# Patient Record
(demographics unavailable — no encounter records)

---

## 2024-11-27 NOTE — HISTORY OF PRESENT ILLNESS
[FreeTextEntry1] : Interval History: 4/2/2024 Pt never received the azathioprine. On prednisone 5mg. No abdominal pain. 12/28/2023 Last seen in 6/2023, lost to follow up. Has not been on MMF. Has been on medrol 4mg-12mg self-medicating for abdominal pain. MRI abdomen pelvis showing slightly increased mesenteric lymphadenopathy. Pt has had HIV exposure recently with someone that is reportedly undetectable. She is following with her GYN for this.  6/20/2023 Patient missed multiple appointments due to recurrent infections.  She had influenza earlier this year.  Then had fungal throat infection likely from overuse of Flonase and concurrent prednisone use.  She is on her final week of antibiotics as well for another infection.  She never started taking mycophenolate.  She denies any lupus flare symptoms.  No abdominal pain  1/31/2023 when she tapered methylpred to 2mg she started having sporadic abdominal pains so increased to 4mg and has been well on 4mg.  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 Otherwise no rash, joint pain, oral nasal ulcers, hair loss  10/2022 Pt is doing well, no abdominal pain. On Medrol 8mg daily x 1 week Had CT A/P on Monday, no results yet. Seeing GI, requested fecal calprotectin which she has not submitted no SLE symptoms rash joint pain

## 2024-11-27 NOTE — ASSESSMENT
[FreeTextEntry1] : 1.  SLE: Seen by me in 2013 for SLE. She switched doctors to TERESITA Roberson and participated in studies before switching to RAMSES Munguia. She was diagnosed in 2007 (initially given dx of JEREMIAH) with pleural effusions in 2012, arthritis, oral ulcers, cytopenias, Raynaud's, lymphadenopathy, + MARTINE, + DNA, hypocomplementemia.  She has additionally had a history of nausea and vomiting which is attributed to SLE. She has been enrolled in the [employer] EMBRACE trial of belimumab with control of her GI features. She enrolled in [place] Believe trial in Feb 2019 and received rituximab/placebo as well as 48 weeks of subcutaneous belimumab with improvement of her pleuritic chest pains, joint pains, GI symptoms and serologies.   Last seen by Dr. Roberson in Nov 2021  +Jacoud's deformities, SLE- clinically stable 2.  Mesenteric lymphadenopathy:  Patient admitted on 8/2022 with abd pain, nausea and vomiting x 3 days. CT A/P showed possible SMV thrombus. Triple phase CT showed effacement of the SMV with surrounding soft tissue infiltration. Subsequently, Hem/Onc was involved and performed a PET/CT scan which showed multiple sites of pathologic FDG adenopathy including bilateral cervical, left supraclavicular, R paratracheal, extensive para-aortic, mesenteric and bilateral iliac adenopathy suspicious for biologic tumor activity or diffuse inflammatory process. Diffusely increased FDG uptake throughout stomach. Highest SUV noted at midline mesenteric adenopathy 24.1, conglomerate left presacral adenopathy 22.4, and left iliac adenopathy 22, left cervical level 1-2 14.7. GI got involved and patient underwent to EGD which was negative for acute finding. Patient underwent to EUS/FNA on superior mesenteric tissue and showed polymorphous lymphoid population w/tingible-body macrophages, lymphohistiocytic aggregates, and lymphoid tangles. Pancreatic parenchyma w/fibrosis, reactive myofibroblastic proliferation and inflammation. Subsequently, patient underwent to left cervical lymph bx and showed follicular and interfollicular hyperplasia with positive PAX5. Hem/Onc was convinced that this is more reactive lymphadenopathy for underlying inflammatory disease. Due to bowel preparation issue, colonoscopy was not done during that admission. Patient was DC w/Eliquis and prednisone 40 mg daily. APLs labs were all negative. Patient was DC and follow up with Dr. Munguia.   3. Anemia - chronic iron deficiency anemia, previously on IV iron  4.  Vocal cord abnormality  Plan Lab tests Reviewed internal and/or external records of other providers Contact me Flu vaccine left deltoid Prevnar 20 IM right deltoid High risk medications used in the treatment of rheumatic diseases include steroids, disease modifying agents, immunosuppressive therapies, antimalarials, biologics, and chemotherapy. Regardless of which drug or class of drug, the potential toxicities of these therapies mandate close monitoring in the form of a history, physical, and laboratory tests. Systemic Lupus Erythematosus, known as lupus, is a chronic autoimmune disease that can affect any organ in the body posing threats to proper organ function and even to life. Therefore, close surveillance of all bodily functions is required, including but not limited to central and peripheral nervous system, ocular and auditory systems, cardiopulmonary function, kidney function, mucocutaneous and musculoskeletal systems as well as constitutional manifestations. Surveillance consists of history, physical, and laboratory tests. Treatment varies, but most of the drugs used are high risk and therefore also require close monitoring in the form of blood and urine tests.

## 2024-11-27 NOTE — REVIEW OF SYSTEMS
[Fever] : no fever [Chills] : no chills [Feeling Poorly] : not feeling poorly [Feeling Tired] : feeling tired [Recent Weight Loss (___ Lbs)] : recent [unfilled] ~Ulb weight loss [Eye Pain] : no eye pain [Red Eyes] : eyes not red [Earache] : no earache [Loss Of Hearing] : no hearing loss [Abdominal Pain] : abdominal pain [Vomiting] : no vomiting [Constipation] : no constipation [Diarrhea] : no diarrhea [Skin Lesions] : no skin lesions [Skin Wound] : no skin wound [Easy Bleeding] : no tendency for easy bleeding [Easy Bruising] : no tendency for easy bruising [Swollen Glands] : no swollen glands [Swollen Glands In The Neck] : no swollen glands in the neck [FreeTextEntry7] : Dull abdominal pain in the epigastrium and hypogastric area mostly at night 10/10 improved with steroid and oxycodone

## 2024-11-27 NOTE — PHYSICAL EXAM
[General Appearance - Alert] : alert [General Appearance - In No Acute Distress] : in no acute distress [Sclera] : the sclera and conjunctiva were normal [Outer Ear] : the ears and nose were normal in appearance [Neck Cervical Mass (___cm)] : no neck mass was observed [Respiration, Rhythm And Depth] : normal respiratory rhythm and effort [Auscultation Breath Sounds / Voice Sounds] : lungs were clear to auscultation bilaterally [Apical Impulse] : the apical impulse was normal [Heart Rate And Rhythm] : heart rate was normal and rhythm regular [Heart Sounds] : normal S1 and S2 [Edema] : there was no peripheral edema [Abdomen Soft] : soft [Abdomen Tenderness] : non-tender [Cervical Lymph Nodes Enlarged Posterior Bilaterally] : posterior cervical [Cervical Lymph Nodes Enlarged Anterior Bilaterally] : anterior cervical [Supraclavicular Lymph Nodes Enlarged Bilaterally] : supraclavicular [No CVA Tenderness] : no ~M costovertebral angle tenderness [No Spinal Tenderness] : no spinal tenderness [Skin Color & Pigmentation] : normal skin color and pigmentation [] : no rash [Sensation] : the sensory exam was normal to light touch and pinprick [Motor Exam] : the motor exam was normal [Oriented To Time, Place, And Person] : oriented to person, place, and time [Musculoskeletal - Swelling] : no joint swelling seen [FreeTextEntry1] : 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

## 2024-11-27 NOTE — ASSESSMENT
[FreeTextEntry1] : 1.  SLE: Seen by me in 2013 for SLE. She switched doctors to TERES[facility] Roberson and participated in studies before switching to RAMSES Munguia. She was diagnosed in 2007 (initially given dx of JEREMIAH) with pleural effusions in 2012, arthritis, oral ulcers, cytopenias, Raynaud's, lymphadenopathy, + MARTINE, + DNA, hypocomplementemia.  She has additionally had a history of nausea and vomiting which is attributed to SLE. She has been enrolled in the [employer] EMBRACE trial of belimumab with control of her GI features. She enrolled in [place] Believe trial in Feb 2019 and received rituximab/placebo as well as 48 weeks of subcutaneous belimumab with improvement of her pleuritic chest pains, joint pains, GI symptoms and serologies.   Last seen by Dr. Roberson in Nov 2021  +Jacoud's deformities, SLE- clinically stable 2.  Mesenteric lymphadenopathy:  Patient admitted on 8/2022 with abd pain, nausea and vomiting x 3 days. CT A/P showed possible SMV thrombus. Triple phase CT showed effacement of the SMV with surrounding soft tissue infiltration. Subsequently, Hem/Onc was involved and performed a PET/CT scan which showed multiple sites of pathologic FDG adenopathy including bilateral cervical, left supraclavicular, R paratracheal, extensive para-aortic, mesenteric and bilateral iliac adenopathy suspicious for biologic tumor activity or diffuse inflammatory process. Diffusely increased FDG uptake throughout stomach. Highest SUV noted at midline mesenteric adenopathy 24.1, conglomerate left presacral adenopathy 22.4, and left iliac adenopathy 22, left cervical level 1-2 14.7. GI got involved and patient underwent to EGD which was negative for acute finding. Patient underwent to EUS/FNA on superior mesenteric tissue and showed polymorphous lymphoid population w/tingible-body macrophages, lymphohistiocytic aggregates, and lymphoid tangles. Pancreatic parenchyma w/fibrosis, reactive myofibroblastic proliferation and inflammation. Subsequently, patient underwent to left cervical lymph bx and showed follicular and interfollicular hyperplasia with positive PAX5. Hem/Onc was convinced that this is more reactive lymphadenopathy for underlying inflammatory disease. Due to bowel preparation issue, colonoscopy was not done during that admission. Patient was DC w/Eliquis and prednisone 40 mg daily. APLs labs were all negative. Patient was DC and follow up with Dr. Munguia.   3. Anemia - chronic iron deficiency anemia, previously on IV iron  4.  Vocal cord abnormality  Plan Lab tests Reviewed internal and/or external records of other providers Contact me Flu vaccine left deltoid Prevnar 20 IM right deltoid High risk medications used in the treatment of rheumatic diseases include steroids, disease modifying agents, immunosuppressive therapies, antimalarials, biologics, and chemotherapy. Regardless of which drug or class of drug, the potential toxicities of these therapies mandate close monitoring in the form of a history, physical, and laboratory tests. Systemic Lupus Erythematosus, known as lupus, is a chronic autoimmune disease that can affect any organ in the body posing threats to proper organ function and even to life. Therefore, close surveillance of all bodily functions is required, including but not limited to central and peripheral nervous system, ocular and auditory systems, cardiopulmonary function, kidney function, mucocutaneous and musculoskeletal systems as well as constitutional manifestations. Surveillance consists of history, physical, and laboratory tests. Treatment varies, but most of the drugs used are high risk and therefore also require close monitoring in the form of blood and urine tests.

## 2025-03-12 NOTE — ASSESSMENT
[FreeTextEntry1] : 32 F with SLE, recent fluA with superimposed bacterial infection (late February 2025) presents for acute telehealth visit for persistent symptoms. Concerning for sinusitis with component of bacterial conjunctivitis  #sinusitis - s/p 7d of amoxicillin (500 mg TID) with improvement but then return of symptoms - will trial doxycycline 100 mg BID x 7 days - counseled on conservative measures for symptomatic relief - no SOB, cough, wheezing: low concern for superimposed PNA  #conjunctivitis - unilateral, purulent, c/f bacterial conjunctivitis - pt reports she often requires antibiotic ophthalmic ointments in this setting - will trial erythromycin eye drops   RTC in 2 weeks if no improvement  Discussed with Dr. Kieran Frank MD PGY3, Internal Medicine

## 2025-03-12 NOTE — REVIEW OF SYSTEMS
[Fever] : fever [Chills] : chills [Discharge] : discharge [Dryness] : dryness  [Nasal Discharge] : nasal discharge [Postnasal Drip] : postnasal drip [Negative] : Gastrointestinal [Chest Pain] : no chest pain [Palpitations] : no palpitations

## 2025-03-12 NOTE — HISTORY OF PRESENT ILLNESS
[Home] : at home, [unfilled] , at the time of the visit. [Medical Office: ([facility])___] : at the medical office located in  [Telehealth (audio & video)] : This visit was provided via telehealth using real-time 2-way audio visual technology. [Verbal consent obtained from patient] : the patient, [unfilled] [FreeTextEntry8] : 32 F with SLE  Finished antibiotics late February (7d course of amoxicillin 500 mg TID). Reports improvement with antibiotics. Since then, reports continued symptoms - intermittent chills, headaches, and sinus pains with drainage (clear). Feels chills coming on and takes ibuprofen early to break the fever. Past 2-3 days also endorses eye infection - primarily R eye, woke up this morning with purulent drainage around the eye.   Reports that she has previously had instances where it has taken her an extended period of time to recover from a URI and has sometimes required multiple courses of abx.  Denies productive cough, wheezing, SOB.

## 2025-03-12 NOTE — PHYSICAL EXAM
[Well Nourished] : well nourished [Well Developed] : well developed [Well-Appearing] : well-appearing [Normal Voice/Communication] : normal voice/communication [No Respiratory Distress] : no respiratory distress  [No Accessory Muscle Use] : no accessory muscle use [de-identified] : conjunctiva appear clear

## 2025-05-29 NOTE — ASSESSMENT
[FreeTextEntry1] : 32 F with SLE, recent fluA with superimposed bacterial infection (late February 2025) and again in March 2025 presents for acute telehealth visit for possible cold.

## 2025-05-29 NOTE — HISTORY OF PRESENT ILLNESS
[Home] : at home, [unfilled] , at the time of the visit. [Medical Office: ([facility])___] : at the medical office located in  [Telehealth (audio & video)] : This visit was provided via telehealth using real-time 2-way audio visual technology. [Verbal consent obtained from patient] : the patient, [unfilled] [FreeTextEntry8] : 32 F with SLE, recent fluA with superimposed bacterial infection (late 2025) and again in 2025 presents for acute telehealth visit for possible cold.  Pt reports that since her amoxicillin and doxycyxline she has been unable to shake her symptoms. Her symptoms completely resolved after abx in march but returned soon after. She has been sick for about the last week in the past. Mom might be sick. + Stuffy nose, runny nose, cough. +B/l eyes itchy and irritated. Sometimes painful. + eye yellow discharge throughout the whole day that came back after wiping. + White mucous that shes coughing up. No fevers. Some chills. +N/V a couple of days ago. +Using nasal spray with relief. No diarrhea. Home COVID test is .   Meds: Valacyclovir as needed Prednisone 5mg but inconsistently taking. Plaquenil (also not taking) Has been off of meds for 5 months according to rheum  Will be transitioning to other [facility] PCP soon. States that her GYN recommended that she be co-prescribed fluconazole whenever she takes abx as she has frequent yeast infections.

## 2025-05-29 NOTE — PHYSICAL EXAM
[de-identified] : Virtual. Non toxic appearing and in no distress. Talking in full sentences. L eye with some erythyema. Hard to discern discharge. She notes some very mild tender lymphadenopathy in the neck.

## 2025-05-29 NOTE — PLAN
[FreeTextEntry1] : #Sinusitis - Given recurrence in symptoms and prior help from abx, will treat with 7 days of augmentin - Counseled to make appt with ENT for structural evaluation - Check flu/covid/RSV swab  #Conjunctivitis - + Yellow discharge that comes back after wiping. Sometimes wears contacts for dance shows. Ciprofloxacin eye drops sent.  #Candidiasis - Prophylactic fluconazole 2 pills sent  Counseled to let us know if any issues or further concerns.

## 2025-06-24 NOTE — HISTORY OF PRESENT ILLNESS
[de-identified] : 30 year old female presents with hx of multiple viral sx, influenza, conjunctivitis and bronchial inflammation. Went on antibiotics. Patient has lupus so took time to resolve. Now with dry cough and sore throat. On prednisone for lupus which helps sore throat. Also has tried flonase for last week without improvement. Denies using inhalers for lungs.  Patient states it hurts with swallowing. Denies nasal congestion or runny nose, denies sinus pressure.  Patient now has a hoarse voice and was told she has nodules on her vocal cords.   Patient is following up for constant throat pain for last 3 days. Believes she has a cold. Has been taking alieve for pain which helps. If not taking alieve, feels pain with swallowing. Had a fever 101 yesterday, stuffy nose yesterday and runny nose today. Denies cough. Denies difficulty swallowing, denies changes in voice.   Patient is following up for worsening constant throat pain for last week, states it was occurring for a month but got more severe. States painful to swallow and feels discomfort in right ear upon swallowing. Throat feels inflamed all day long. Voice goes in and out, has been trying to drink lots of tea. States her usual prednisone levels it out but doesnt resolve discomfort. Pain improves with OTC pain medication like alieve.    Also getting intermittent runny and stuffy nose.  [FreeTextEntry1] : Patient is following up with runny nose, stuffy nose, and pressure at nose and cheeks last month. Took amox-clav from urgent care 2 weeks ago, they then referred her to ENT. Did not take lupus medications today. Used flonase for 1 week in June, states was not consistent.   States has sore throat and throat swelling for last 2 weeks.   also feeling pulling and pressure at ears.

## 2025-06-24 NOTE — PROCEDURE
[FreeTextEntry6] : Procedure performed: Nasal Endoscopy- Diagnostic Pre-op indication(s): nasal congestion Post-op indication(s): nasal congestion  Verbal and/or written consent obtained from patient Anterior rhinoscopy insufficient to account for symptoms Scope #: 3,  flexible fiber optic telescope  The scope was introduced in the nasal passage between the middle and inferior turbinates to exam the inferior portion of the middle meatus and the fontanelle, as well as the maxillary ostia.  Next, the scope was passed medically and posteriorly to the middle turbinates to examine the sphenoethmoid recess and the superior turbinate region. Upon visualization the finders are as follows: Nasal Septum: sigmoidal L septal deviation Bilateral - Mucosa: boggy turbinates, Mucous: thick secretions b/l, Polyp: not seen, Inferior Turbinate: boggy, Middle Turbinate: normal, Superior Turbinate: normal, Inferior Meatus: narrow, Middle Meatus: narrow, Super Meatus:normal, Sphenoethmoidal Recess: clear [de-identified] : Procedure performed: laryngeal Endoscopy- Diagnostic Pre-op/post op indication: dysphonia Verbal and/or written consent obtained from patient, Patient was unable to cooperate with mirror Scope #: 3, flexible fiber optic telescope used  Scope was introduced through the nose passed on the floor of the nose to the nasopharynx and then followed down the soft palate to the lower pharynx. The tongue Base, Larynx, Hypopharynx were examined. Base of tongue was symmetric, vallecular was clear, epiglottis was not deformed, subglottis/ pyriform and posterior pharyngeal walls were clear. No erythema, edema, pooling of secretions, masses or lesions. Airway patent, no foreign body visualized. Postcricoid area with moderate erythema and mild edema. + intra-artenoid bar. No pooling of secretions. True vocal cords, vestibular folds, ventricles, pyriform sinuses, and aryepiglottic folds appear normal bilaterally. Vocal cords mobile with good contact b/l.

## 2025-06-24 NOTE — CONSULT LETTER
[Please see my note below.] : Please see my note below. [FreeTextEntry1] : Dear Dr. SANDY MORALES \par  I had the pleasure of evaluating your patient ABELARDO RABAGO, thank you for allowing us to participate in their care. please see full note detailing our visit below.\par  If you have any questions, please do not hesitate to call me and I would be happy to discuss further. \par  \par  Graham Lemus M.D.\par  Attending Physician,  \par  Department of Otolaryngology - Head and Neck Surgery\par  [facility] \par  Office: [phone]\par  Fax: [phone]\par

## 2025-06-24 NOTE — ASSESSMENT
[FreeTextEntry1] : 31 year old female with hx of lupus presents with 2 weeks of sore throat. On exam, thick secretions dripping back, nodules b/l VC- likely due to voice overuse/misuse.  white plaque on right vocal fold has resolved.  pt on other immune suppressant  - consider following up with Dr. Lainez for further evaluation of nodules  - restart voice therapy - new consult placed   Patient also with nasal congestion and facial pressure. On exam, L DNS, and thick secretions b/l consistent with acute sinusitis  - Allergy evaluation and possible treatments discussed. Additionally, we will proceed with a regiment of decongestants and medication to reduce inflammation and an extended course of antibiotics. The patient was instructed to continue nasal irrigation and topical steroid spray for over 1 month. The goal is to try to break the cycle of inflammation and obstruction leading to resolution of the acute and chronic symptoms. It will hopefully allow for maintenance therapy to reach disease areas and avoid further intervention. - medrol  - Will start Flonase. A topical steroid reduce mucosal swelling, illustrated appropriate use and how to reduce the risk of bleeding  - Nasal irrigation and showed how to use it to maximize effectiveness  - follow up if sx persist

## 2025-06-24 NOTE — END OF VISIT
[FreeTextEntry3] : I personally saw and examined  the patient in detail.  I spoke to KATERIN Soriano regarding the assessment and plan of care. I performed the procedures and relevant physical exam.  I have reviewed the above assessment and plan of care and I agree.  I have made changes to the body of the note wherever necessary and appropriate

## 2025-06-24 NOTE — PHYSICAL EXAM
[Normal] : lingual tonsils are normal [Midline] : trachea located in midline position [de-identified] : mild erythema